# Patient Record
Sex: FEMALE | Race: BLACK OR AFRICAN AMERICAN | NOT HISPANIC OR LATINO | URBAN - METROPOLITAN AREA
[De-identification: names, ages, dates, MRNs, and addresses within clinical notes are randomized per-mention and may not be internally consistent; named-entity substitution may affect disease eponyms.]

---

## 2021-08-02 ENCOUNTER — EMERGENCY (EMERGENCY)
Facility: HOSPITAL | Age: 36
LOS: 1 days | Discharge: HOME | End: 2021-08-02
Attending: EMERGENCY MEDICINE | Admitting: EMERGENCY MEDICINE
Payer: MEDICAID

## 2021-08-02 VITALS
TEMPERATURE: 99 F | SYSTOLIC BLOOD PRESSURE: 128 MMHG | OXYGEN SATURATION: 99 % | DIASTOLIC BLOOD PRESSURE: 90 MMHG | HEART RATE: 106 BPM | RESPIRATION RATE: 16 BRPM

## 2021-08-02 VITALS
SYSTOLIC BLOOD PRESSURE: 125 MMHG | TEMPERATURE: 98 F | HEART RATE: 98 BPM | HEIGHT: 59 IN | DIASTOLIC BLOOD PRESSURE: 85 MMHG | OXYGEN SATURATION: 100 % | RESPIRATION RATE: 16 BRPM | WEIGHT: 220.02 LBS

## 2021-08-02 DIAGNOSIS — K21.9 GASTRO-ESOPHAGEAL REFLUX DISEASE WITHOUT ESOPHAGITIS: ICD-10-CM

## 2021-08-02 DIAGNOSIS — E78.5 HYPERLIPIDEMIA, UNSPECIFIED: ICD-10-CM

## 2021-08-02 DIAGNOSIS — Z90.3 ACQUIRED ABSENCE OF STOMACH [PART OF]: ICD-10-CM

## 2021-08-02 DIAGNOSIS — R07.89 OTHER CHEST PAIN: ICD-10-CM

## 2021-08-02 DIAGNOSIS — E66.9 OBESITY, UNSPECIFIED: ICD-10-CM

## 2021-08-02 DIAGNOSIS — Z87.19 PERSONAL HISTORY OF OTHER DISEASES OF THE DIGESTIVE SYSTEM: ICD-10-CM

## 2021-08-02 LAB
ALBUMIN SERPL ELPH-MCNC: 4.2 G/DL — SIGNIFICANT CHANGE UP (ref 3.3–5)
ALP SERPL-CCNC: 93 U/L — SIGNIFICANT CHANGE UP (ref 40–120)
ALT FLD-CCNC: 17 U/L — SIGNIFICANT CHANGE UP (ref 10–45)
ANION GAP SERPL CALC-SCNC: 10 MMOL/L — SIGNIFICANT CHANGE UP (ref 5–17)
ANISOCYTOSIS BLD QL: SLIGHT — SIGNIFICANT CHANGE UP
AST SERPL-CCNC: 42 U/L — HIGH (ref 10–40)
BASOPHILS # BLD AUTO: 0 K/UL — SIGNIFICANT CHANGE UP (ref 0–0.2)
BASOPHILS NFR BLD AUTO: 0 % — SIGNIFICANT CHANGE UP (ref 0–2)
BILIRUB SERPL-MCNC: 0.4 MG/DL — SIGNIFICANT CHANGE UP (ref 0.2–1.2)
BUN SERPL-MCNC: 9 MG/DL — SIGNIFICANT CHANGE UP (ref 7–23)
CALCIUM SERPL-MCNC: 9 MG/DL — SIGNIFICANT CHANGE UP (ref 8.4–10.5)
CHLORIDE SERPL-SCNC: 105 MMOL/L — SIGNIFICANT CHANGE UP (ref 96–108)
CO2 SERPL-SCNC: 24 MMOL/L — SIGNIFICANT CHANGE UP (ref 22–31)
CREAT SERPL-MCNC: 0.88 MG/DL — SIGNIFICANT CHANGE UP (ref 0.5–1.3)
D DIMER BLD IA.RAPID-MCNC: 160 NG/ML DDU — SIGNIFICANT CHANGE UP
EOSINOPHIL # BLD AUTO: 0 K/UL — SIGNIFICANT CHANGE UP (ref 0–0.5)
EOSINOPHIL NFR BLD AUTO: 0 % — SIGNIFICANT CHANGE UP (ref 0–6)
GLUCOSE SERPL-MCNC: 100 MG/DL — HIGH (ref 70–99)
HCT VFR BLD CALC: 35.1 % — SIGNIFICANT CHANGE UP (ref 34.5–45)
HGB BLD-MCNC: 11.2 G/DL — LOW (ref 11.5–15.5)
HYPOCHROMIA BLD QL: SLIGHT — SIGNIFICANT CHANGE UP
LIDOCAIN IGE QN: 25 U/L — SIGNIFICANT CHANGE UP (ref 7–60)
LYMPHOCYTES # BLD AUTO: 1.24 K/UL — SIGNIFICANT CHANGE UP (ref 1–3.3)
LYMPHOCYTES # BLD AUTO: 7.8 % — LOW (ref 13–44)
MANUAL SMEAR VERIFICATION: SIGNIFICANT CHANGE UP
MCHC RBC-ENTMCNC: 23.3 PG — LOW (ref 27–34)
MCHC RBC-ENTMCNC: 31.9 GM/DL — LOW (ref 32–36)
MCV RBC AUTO: 73.1 FL — LOW (ref 80–100)
MICROCYTES BLD QL: SIGNIFICANT CHANGE UP
MONOCYTES # BLD AUTO: 0.14 K/UL — SIGNIFICANT CHANGE UP (ref 0–0.9)
MONOCYTES NFR BLD AUTO: 0.9 % — LOW (ref 2–14)
NEUTROPHILS # BLD AUTO: 14.54 K/UL — HIGH (ref 1.8–7.4)
NEUTROPHILS NFR BLD AUTO: 90.4 % — HIGH (ref 43–77)
NEUTS BAND # BLD: 0.9 % — SIGNIFICANT CHANGE UP (ref 0–8)
OVALOCYTES BLD QL SMEAR: SLIGHT — SIGNIFICANT CHANGE UP
PLAT MORPH BLD: NORMAL — SIGNIFICANT CHANGE UP
PLATELET # BLD AUTO: 384 K/UL — SIGNIFICANT CHANGE UP (ref 150–400)
POTASSIUM SERPL-MCNC: 3.8 MMOL/L — SIGNIFICANT CHANGE UP (ref 3.5–5.3)
POTASSIUM SERPL-SCNC: 3.8 MMOL/L — SIGNIFICANT CHANGE UP (ref 3.5–5.3)
PROT SERPL-MCNC: 7.5 G/DL — SIGNIFICANT CHANGE UP (ref 6–8.3)
RBC # BLD: 4.8 M/UL — SIGNIFICANT CHANGE UP (ref 3.8–5.2)
RBC # FLD: 17.3 % — HIGH (ref 10.3–14.5)
RBC BLD AUTO: ABNORMAL
SCHISTOCYTES BLD QL AUTO: SLIGHT — SIGNIFICANT CHANGE UP
SODIUM SERPL-SCNC: 139 MMOL/L — SIGNIFICANT CHANGE UP (ref 135–145)
STOMATOCYTES BLD QL SMEAR: SLIGHT — SIGNIFICANT CHANGE UP
TROPONIN T SERPL-MCNC: 0.01 NG/ML — SIGNIFICANT CHANGE UP (ref 0–0.01)
WBC # BLD: 15.93 K/UL — HIGH (ref 3.8–10.5)
WBC # FLD AUTO: 15.93 K/UL — HIGH (ref 3.8–10.5)

## 2021-08-02 PROCEDURE — 96374 THER/PROPH/DIAG INJ IV PUSH: CPT

## 2021-08-02 PROCEDURE — 85025 COMPLETE CBC W/AUTO DIFF WBC: CPT

## 2021-08-02 PROCEDURE — 71046 X-RAY EXAM CHEST 2 VIEWS: CPT | Mod: 26

## 2021-08-02 PROCEDURE — 80053 COMPREHEN METABOLIC PANEL: CPT

## 2021-08-02 PROCEDURE — 93005 ELECTROCARDIOGRAM TRACING: CPT

## 2021-08-02 PROCEDURE — 93010 ELECTROCARDIOGRAM REPORT: CPT

## 2021-08-02 PROCEDURE — 84484 ASSAY OF TROPONIN QUANT: CPT

## 2021-08-02 PROCEDURE — 99284 EMERGENCY DEPT VISIT MOD MDM: CPT | Mod: 25

## 2021-08-02 PROCEDURE — 83690 ASSAY OF LIPASE: CPT

## 2021-08-02 PROCEDURE — 85379 FIBRIN DEGRADATION QUANT: CPT

## 2021-08-02 PROCEDURE — 71046 X-RAY EXAM CHEST 2 VIEWS: CPT

## 2021-08-02 PROCEDURE — 36415 COLL VENOUS BLD VENIPUNCTURE: CPT

## 2021-08-02 PROCEDURE — 99285 EMERGENCY DEPT VISIT HI MDM: CPT | Mod: 25

## 2021-08-02 RX ORDER — FAMOTIDINE 10 MG/ML
1 INJECTION INTRAVENOUS
Qty: 30 | Refills: 0
Start: 2021-08-02 | End: 2021-08-31

## 2021-08-02 RX ORDER — FAMOTIDINE 10 MG/ML
20 INJECTION INTRAVENOUS ONCE
Refills: 0 | Status: COMPLETED | OUTPATIENT
Start: 2021-08-02 | End: 2021-08-02

## 2021-08-02 RX ADMIN — FAMOTIDINE 20 MILLIGRAM(S): 10 INJECTION INTRAVENOUS at 16:19

## 2021-08-02 NOTE — ED PROVIDER NOTE - NSFOLLOWUPINSTRUCTIONS_ED_ALL_ED_FT
Chest pain    Please follow up with your gastroenterologist and your bariatric surgeon as well as your pmd.  You may also benefit from a cardiology evaluation.  Your symptoms could be due to your GERD.  Try adding pepcid to your medications. Add tums/maalox/mylanta etc as directed for additional relief.  Avoid spicy and/or acidic foods (citrus, tomatoes, etc), alcohol, caffeine, and NSAID medications (ibuprofen, aleve, advil, motrin, etc).       Return for increased pain, difficulty breathing, palpitations, vomiting or diarrhea, fever, black/bloody stools, any other concerns.     Consider seeing a colorectal surgeon for your hemorrhoid concerns.

## 2021-08-02 NOTE — ED ADULT TRIAGE NOTE - CHIEF COMPLAINT QUOTE
Pt BIBA from work CO "chest tightness" and associated Nausea.  Hx of Gastric Sleeve.  Pt ambulating with steady gait and denies SOB, Fevers, Dizziness, vomiting, diarrhea.

## 2021-08-02 NOTE — ED PROVIDER NOTE - OBJECTIVE STATEMENT
35 y/o F non-smoker w/ hx of obesity s/p gastric sleeve, severe GERD, HLD, colitis last year w/ chronic diarrhea since diagnosis. S/p colonoscopy on 7/27 showed hemorrhoids and nml colon, endoscopy showed esophagitis s/p sleeve gastrectomy and polyps (s/p removal). Pt c/o 1 hour of cp that feels like stinging w/o radiation. Pt states her symptoms are different than her typical GERD symptoms which usually feel like burning. Denies associated sob, palpitations, recent cough or URI symptoms, abd pain n/v/d and exertional cp. Notes she does feel sob after climbing 2 sets of stairs of her 4th floor walk up and has felt the same sob for past 3 yrs with no acute change. Pt also reports right lower extremity swelling which is nml for her, no increased lower extremity swelling. No travel, hx of PE, DVT, CAD, or DM and no family hx CAD, DM or PE. Non smoker. 37 y/o F non-smoker w/ hx of HLD, PCOS, obesity s/p gastric sleeve, severe GERD, HLD, colitis last year w/ chronic diarrhea x 1 yr.  Pt s/p colonoscopy on 7/27: hemorrhoids and nml colon, endoscopy: esophagitis s/p sleeve gastrectomy and polyps (s/p removal per pt). Pt c/o 1 hour of cp ~ 12-1 that felt like stinging w/o radiation. Pt states her symptoms are different than her typical GERD symptoms which usually feel like burning. Denies associated sob, palpitations, recent cough or URI symptoms, abd pain n/v/d and exertional cp. Notes she does feel sob after climbing 2 sets of stairs up to her 4th floor walk up but has felt the same sob for past 3 yrs with no acute change w current sx. Pt also reports right lower extremity swelling which is nml for her, no increased lower extremity swelling. No travel, hx of PE, DVT, CAD, or DM and no family hx CAD, DM or PE. Non smoker.  No current cp.  Pt takes pantoprazole 40 bid w/o relief of her gerd sx and is disussing fundoplication surgery.  Pt has appt w her bariatric surgeon this week.

## 2021-08-02 NOTE — ED PROVIDER NOTE - PROGRESS NOTE DETAILS
Labs w wbc 15 o/w wnl. CXR nl.  EKG nl.  Will dc to fu pmd and gi. Labs w wbc 15 o/w wnl. CXR nl.  EKG nl.  Feeling well, sx free. Will dc to fu pmd and gi.

## 2021-08-02 NOTE — ED PROVIDER NOTE - PATIENT PORTAL LINK FT
You can access the FollowMyHealth Patient Portal offered by [facility] by registering at the following website: [email or web address]. By joining [facility]’s FollowMyHealth portal, you will also be able to view your health information using other applications (apps) compatible with our system.

## 2021-08-02 NOTE — ED PROVIDER NOTE - PSH
History of gastrectomy    S/P laparoscopic sleeve gastrectomy     S/P laparoscopic sleeve gastrectomy

## 2021-08-02 NOTE — ED PROVIDER NOTE - CARE PROVIDER_API CALL
Karuna Field)  ColonRectal Surgery; Surgery  [address], [address]  [place] [address]  Phone: [phone]  Fax: [phone]  Follow Up Time:     Iain Casey  COLON/RECTAL SURGERY  [address], [address]  [place] [address]  Phone: [phone]  Fax: [phone]  Follow Up Time:

## 2021-08-02 NOTE — ED PROVIDER NOTE - PMH
Colitis    GERD (gastroesophageal reflux disease)    Hemorrhoids    Obesity     Colitis    GERD (gastroesophageal reflux disease)    Hemorrhoids    HLD (hyperlipidemia)    Obesity    PCOS (polycystic ovarian syndrome)

## 2021-08-02 NOTE — ED PROVIDER NOTE - CLINICAL SUMMARY MEDICAL DECISION MAKING FREE TEXT BOX
Pt c/o resolved cp - stinging sensation x 1 h ~ 12-1 w/o associated sx.  + hld but no other cardiac risks.  EKG wnl.  Pt notes diez climbing to her 4th floor walkup x 3 y w/o change recently. No sig concern for acs.  No pe risks.  No pna sx. + h/o gerd, ? esophageal spasm/esophagitis related (egd 7/27 w esophagitis).   Plan labs, pepcid, cxr, ekg; likely dc to fu pmd, gi and cardiology as outpt.

## 2021-08-03 DIAGNOSIS — Z98.84 BARIATRIC SURGERY STATUS: Chronic | ICD-10-CM
